# Patient Record
Sex: MALE | Race: WHITE | NOT HISPANIC OR LATINO | URBAN - NONMETROPOLITAN AREA
[De-identification: names, ages, dates, MRNs, and addresses within clinical notes are randomized per-mention and may not be internally consistent; named-entity substitution may affect disease eponyms.]

---

## 2020-03-17 ENCOUNTER — IMPORTED ENCOUNTER (OUTPATIENT)
Dept: URBAN - NONMETROPOLITAN AREA CLINIC 1 | Facility: CLINIC | Age: 50
End: 2020-03-17

## 2020-03-17 PROBLEM — H25.013: Noted: 2020-03-17

## 2020-03-17 PROBLEM — E11.9: Noted: 2017-03-10

## 2020-03-17 PROBLEM — H43.811: Noted: 2017-03-10

## 2020-03-17 PROBLEM — Z79.4: Noted: 2017-03-10

## 2020-03-17 PROCEDURE — 92015 DETERMINE REFRACTIVE STATE: CPT

## 2020-03-17 PROCEDURE — 92014 COMPRE OPH EXAM EST PT 1/>: CPT

## 2020-03-17 NOTE — PATIENT DISCUSSION
Presbyopia OU- Discussed refractive status with patient- New glasses Rx given today- Optos done today WNL OU- Continue to monitor- RTC 1 year completeAmblyopia OD- Discussed diagnosis with patient- No history of trauma- Continue to [place] Retinopathy OU- Discussed diagnosis in detail with patient- Stressed importance of good blood sugar control- Recommend no soda’s- Patient reports last A1C was ____ and last blood sugar was____- Letter to - Continue to monitorCortical Cataract OU- Discussed diagnosis in detail with patient- Discussed signs and symptoms of progression- Discussed UV protection- No treatment needed at this time - Continue to monitorPVD OD - Discussed findings of exam in detail with the patient. - The risk of retinal detachment in patients with PVDs was discussed with the patient and the warning signs of retinal detachment were carefully reviewed with the patient. - The patient was warned to return to the office or contact the ophthalmologist on call immediately if they experience signs of retinal detachment.; 's Notes: Optos 3/17/20MR 3/17/20

## 2022-04-15 ASSESSMENT — TONOMETRY
OS_IOP_MMHG: 14
OD_IOP_MMHG: 14

## 2022-04-15 ASSESSMENT — VISUAL ACUITY
OS_SC: 20/20
OD_SC: 20/63
OD_PH: 20/50

## 2023-04-18 ENCOUNTER — NEW PATIENT (OUTPATIENT)
Dept: URBAN - NONMETROPOLITAN AREA CLINIC 1 | Facility: CLINIC | Age: 53
End: 2023-04-18

## 2023-04-18 DIAGNOSIS — H52.4: ICD-10-CM

## 2023-04-18 PROCEDURE — 92015 DETERMINE REFRACTIVE STATE: CPT

## 2023-04-18 PROCEDURE — 92004 COMPRE OPH EXAM NEW PT 1/>: CPT

## 2023-04-18 PROCEDURE — 92499OP2 OPTOMAP RETINAL SCREENING BOTH EYES

## 2023-04-18 ASSESSMENT — VISUAL ACUITY
OD_PH: 20/80
OD_CC: 20/100
OU_CC: 20/20
OS_CC: 20/20-1

## 2023-04-18 ASSESSMENT — TONOMETRY
OD_IOP_MMHG: 17
OS_IOP_MMHG: 17

## 2024-04-23 ENCOUNTER — ESTABLISHED PATIENT (OUTPATIENT)
Dept: URBAN - NONMETROPOLITAN AREA CLINIC 1 | Facility: CLINIC | Age: 54
End: 2024-04-23

## 2024-04-23 DIAGNOSIS — H43.811: ICD-10-CM

## 2024-04-23 DIAGNOSIS — H52.4: ICD-10-CM

## 2024-04-23 PROCEDURE — 92014 COMPRE OPH EXAM EST PT 1/>: CPT

## 2024-04-23 PROCEDURE — 92015 DETERMINE REFRACTIVE STATE: CPT

## 2024-04-23 PROCEDURE — 92499OP2 OPTOMAP RETINAL SCREENING BOTH EYES

## 2024-04-23 ASSESSMENT — TONOMETRY
OS_IOP_MMHG: 16
OD_IOP_MMHG: 17

## 2024-04-23 ASSESSMENT — VISUAL ACUITY
OU_SC: 20/20
OS_SC: 20/25
OD_PH: 20/80
OD_SC: 20/100

## 2025-04-24 ENCOUNTER — COMPREHENSIVE EXAM (OUTPATIENT)
Age: 55
End: 2025-04-24

## 2025-04-24 DIAGNOSIS — H52.4: ICD-10-CM

## 2025-04-24 PROCEDURE — 92015 DETERMINE REFRACTIVE STATE: CPT

## 2025-04-24 PROCEDURE — 92014 COMPRE OPH EXAM EST PT 1/>: CPT

## 2025-04-24 PROCEDURE — 92499OP2 OPTOMAP RETINAL SCREENING BOTH EYES
